# Patient Record
Sex: MALE | Race: WHITE | HISPANIC OR LATINO | URBAN - METROPOLITAN AREA
[De-identification: names, ages, dates, MRNs, and addresses within clinical notes are randomized per-mention and may not be internally consistent; named-entity substitution may affect disease eponyms.]

---

## 2021-06-17 ENCOUNTER — TELEPHONE ENCOUNTER (OUTPATIENT)
Dept: URBAN - METROPOLITAN AREA CLINIC 35 | Facility: CLINIC | Age: 46
End: 2021-06-17

## 2021-09-03 ENCOUNTER — OFFICE VISIT (OUTPATIENT)
Dept: URBAN - METROPOLITAN AREA CLINIC 33 | Facility: CLINIC | Age: 46
End: 2021-09-03

## 2021-09-03 VITALS
DIASTOLIC BLOOD PRESSURE: 85 MMHG | HEIGHT: 70 IN | BODY MASS INDEX: 26.92 KG/M2 | SYSTOLIC BLOOD PRESSURE: 120 MMHG | OXYGEN SATURATION: 98 % | WEIGHT: 188 LBS | HEART RATE: 77 BPM

## 2021-09-03 RX ORDER — ESCITALOPRAM OXALATE 20 MG/1
1 TABLET TABLET, FILM COATED ORAL ONCE A DAY
Status: ACTIVE | COMMUNITY

## 2021-09-03 RX ORDER — DEXTROAMPHETAMINE SULFATE, DEXTROAMPHETAMINE SACCHARATE, AMPHETAMINE SULFATE AND AMPHETAMINE ASPARTATE 5; 5; 5; 5 MG/1; MG/1; MG/1; MG/1
1 CAPSULE IN THE MORNING CAPSULE, EXTENDED RELEASE ORAL ONCE A DAY
Status: ACTIVE | COMMUNITY

## 2021-09-03 RX ORDER — ZOLPIDEM TARTRATE 10 MG/1
1 TABLET AT BEDTIME AS NEEDED TABLET, FILM COATED ORAL ONCE A DAY
Status: ACTIVE | COMMUNITY

## 2021-09-03 RX ORDER — SODIUM, POTASSIUM,MAG SULFATES 17.5-3.13G
ML AS DIRECTED SOLUTION, RECONSTITUTED, ORAL ORAL
Qty: 1 KIT | Refills: 0 | OUTPATIENT
Start: 2021-09-03

## 2021-09-03 NOTE — HPI-MIGRATED HPI
;     Rectal Bleeding : 45 y/o male patient presents today for a consultation about rectal bleeding.  Bleeding started 2 months ago and happens every day. The blood is very red in color and is present  on tissue and within the toilet bowl, enough to stain the water red.  Patient currently admits 2 formed bowel movements per day. He denies any associated symptoms of mucus, melena, pruritus ani, rectal pain, abdominal cramping/pain, bloating, gas, or heartburn.   Patient denies aggravating factors or alleviating factors. Patient denies to taking any medications.   Patient admits having a EGD in the past with normal findings. Patient denies a family hx of colon, gastric, or esophageal cancer/polyps. ;

## 2021-09-28 ENCOUNTER — OFFICE VISIT (OUTPATIENT)
Dept: URBAN - METROPOLITAN AREA SURGERY CENTER 8 | Facility: SURGERY CENTER | Age: 46
End: 2021-09-28

## 2021-09-30 ENCOUNTER — DASHBOARD ENCOUNTERS (OUTPATIENT)
Age: 46
End: 2021-09-30

## 2021-10-07 ENCOUNTER — TELEPHONE ENCOUNTER (OUTPATIENT)
Dept: URBAN - METROPOLITAN AREA CLINIC 35 | Facility: CLINIC | Age: 46
End: 2021-10-07

## 2021-10-11 ENCOUNTER — OFFICE VISIT (OUTPATIENT)
Dept: URBAN - METROPOLITAN AREA CLINIC 33 | Facility: CLINIC | Age: 46
End: 2021-10-11

## 2021-10-11 NOTE — HPI-MIGRATED HPI
;   ;     Colonoscopy Follow-Up : 47 y/o male patient presents today for follow up to his colonoscopy which was completed on (09/28/2021) by Dr. Cal Larsen.  Patient admits/denies any complications after his/her procedure. Patient currently admits __ formed bowel movements per day.  Patient denies any melena, blood or mucus in stools. Patient denies any associated abdominal pain, heartburn, bloating, or gas.    Colonoscopy report shows: Preparation of the colon was fair. Internal hemorrhoids. Banded. Diverticulosis in the sigmoid colon and in the descending colon. No specimens collected. ;   Rectal Bleeding : 47 y/o male patient presents today for a ongoing symptom??? about rectal bleeding.  Bleeding started 3 months??? ago and happens -- times a week. The blood is __ in color and is present  on tissue and within the toilet bowl, enough to stain the water red.  Patient currently admits __ bowel movements per __.  S/He denies any associated symptoms of mucus, melena, pruritus ani, rectal pain, abdominal cramping/pain, bloating, gas, or  heartburn.   Patient admits/denies aggravating factors as ---- and/or alleviating factors as ---- . Patient admits to taking --- medications with --- relief of symptoms.      (Last visit: 9/3/91713) 47 y/o male patient presents today for a consultation about rectal bleeding.  Bleeding started 2 months ago and happens every day. The blood is very red in color and is present  on tissue and within the toilet bowl, enough to stain the water red.  Patient currently admits 2 formed bowel movements per day. He denies any associated symptoms of mucus, melena, pruritus ani, rectal pain, abdominal cramping/pain, bloating, gas, or heartburn.   Patient denies aggravating factors or alleviating factors. Patient denies to taking any medications.   Patient admits having a EGD in the past with normal findings. Patient denies a family hx of colon, gastric, or esophageal cancer/polyps.;